# Patient Record
Sex: FEMALE | Race: WHITE | NOT HISPANIC OR LATINO | Employment: OTHER | URBAN - METROPOLITAN AREA
[De-identification: names, ages, dates, MRNs, and addresses within clinical notes are randomized per-mention and may not be internally consistent; named-entity substitution may affect disease eponyms.]

---

## 2024-03-15 ENCOUNTER — TELEPHONE (OUTPATIENT)
Dept: NEUROSURGERY | Facility: CLINIC | Age: 79
End: 2024-03-15
Payer: MEDICARE

## 2024-03-15 NOTE — TELEPHONE ENCOUNTER
----- Message from Madeline Velazquez RN sent at 3/15/2024 10:34 AM CDT -----  Contact: Dr Pulido  I scheduled this katerina lady for a virtual with vijay next week.  Lives in [place]. Hx of spinal surgery lumbar. No recent imaging. Only an mri from 2 years ago.  Lumbar stenosis, last mri 4/1/22, referrral in media mgr.  She is going to send you a letter detailing all of her hx/surgery etc. (She is a writer).   Just wanted ya'll to know.  Thanks    ----- Message -----  From: Nupur Timmons  Sent: 3/13/2024   1:58 PM CDT  To: Felix Barreto Staff    3/13/24    Referral scanned into media mgr, dx DDD- phone [phone]-     Thanks    Nupur    CC

## 2024-03-19 ENCOUNTER — OFFICE VISIT (OUTPATIENT)
Dept: NEUROSURGERY | Facility: CLINIC | Age: 79
End: 2024-03-19
Payer: MEDICARE

## 2024-03-19 DIAGNOSIS — Z98.890 HX OF DECOMPRESSIVE LUMBAR LAMINECTOMY: ICD-10-CM

## 2024-03-19 DIAGNOSIS — M48.07 SPINAL STENOSIS, LUMBOSACRAL REGION: ICD-10-CM

## 2024-03-19 DIAGNOSIS — M41.9 SCOLIOSIS, UNSPECIFIED SCOLIOSIS TYPE, UNSPECIFIED SPINAL REGION: Primary | ICD-10-CM

## 2024-03-19 PROCEDURE — 99204 OFFICE O/P NEW MOD 45 MIN: CPT | Mod: 95,,, | Performed by: NURSE PRACTITIONER

## 2024-03-20 NOTE — PROGRESS NOTES
"Neurosurgery  History & Physical    SUBJECTIVE:   Telehealth Visit     The patient location is: Home  The chief complaint leading to consultation is: Back Pain  Visit type: Virtual visit with audio and video  Total time spent with patient: 12 minutes     Each patient to whom I provide medical services by telemedicine is:  (1) informed of the relationship between the physician and patient and the respective role of any other health care provider with respect to management of the patient; and (2) notified that they may decline to receive medical services by telemedicine and may withdraw from such care at any time. Patient verbally consented to receive this service via voice-only telephone call.     This service was not originating from a related E/M service provided within the previous 7 days nor will  to an E/M service or procedure within the next 24 hours or my soonest available appointment.  Prevailing standard of care was able to be met in this audio-only visit.       History of Present Illness: Kim Adorno is a 78 y.o. female with hx of laminectomy L3-5. She is being seen virtually today to establish care and discuss concerns with persistent low back radiating into the hips. Describes the pain as deep and aching. Aggravating factors include standing and walking. Alleviating factors include rest, stretching, and pain medication. Denies b/b dysfunction, saddle anesthesia, or gait instability.  Reports neuropathy in her feet extending upward to her knees that she describes as "tight". She believes that she is weaker.     Review of patient's allergies indicates:  Not on File    No current outpatient medications on file.     No current facility-administered medications for this visit.       No past medical history on file.  No past surgical history on file.  Family History    None       Social History     Socioeconomic History    Marital status: Single       Review of Systems    OBJECTIVE:     Vital Signs   "   There is no height or weight on file to calculate BMI.      Neurosurgery Physical Exam  General: well developed, well nourished, no distress.   Head: normocephalic  Neurologic: Alert and oriented. Thought content appropriate.  GCS: Motor: 6/Verbal: 5/Eyes: 4 GCS Total: 15  Mental Status: Awake, Alert, Oriented x 4  Language: No aphasia  Speech: No dysarthria  Cranial nerves: CN II-XII grossly intact.   Eyes: EOMI appear grossly intact  Pulmonary: no signs of respiratory distress  Motor Strength:Moves all extremities spontaneously with good tone. BUE and BLE are at least 3/5. No abnormal movements seen.     Diagnostic Results:  There is no new imaging to review for this encounter.      ASSESSMENT/PLAN:     Kim Adorno is a 78 y.o. female with surgical hx of laminectomy L3-5. She is being seen virtually today to establish care and discuss concerns with persistent low back radiating into the hips with a history of scoliosis. I have ordered:    -Updated MRI of the entire spine to evaluate for stenosis given her complaints  - Dynamic x-rays for instability  - CT lumbar spine given her surgical hx of surgical planning     The patient would like to follow-up in clinic with Dr. Washington to discuss the image findings and next steps. I have encouraged her to contact the clinic with any questions, concerns, or adverse clinical changes. She verbalized understanding.      ARNALDO Glaser-BRIAN  Neurosurgery  [facility].      Note dictated with voice recognition software, please excuse any grammatical errors.

## 2024-03-22 ENCOUNTER — TELEPHONE (OUTPATIENT)
Dept: NEUROSURGERY | Facility: CLINIC | Age: 79
End: 2024-03-22
Payer: MEDICARE

## 2024-03-22 NOTE — TELEPHONE ENCOUNTER
Called patient to schedule an upcoming appointment with Estevan Washington MD  and provided next appointment date and time.  Future Appointments   Date Time Provider Department Center   5/16/2024 11:15 AM [facility] OI EOS [facility] EOS [facility]   5/16/2024 11:30 AM [facility]-CT2 500 LB LIMIT [facility] CTSC IC Imaging Ctr   5/16/2024 11:45 AM [facility] OI-XRAY [facility] XRAY [facility]   5/16/2024 12:00 PM [facility] OIC-XRAY [facility] XRAY [facility]   5/16/2024 12:15 PM [facility]-MRI3 [facility] MRI [facility]   5/16/2024  2:30 PM Estevan Washington MD [facility] NEUROS8 Kareem nhung        Patient voiced understanding and thanked me.

## 2024-05-16 ENCOUNTER — OFFICE VISIT (OUTPATIENT)
Dept: NEUROSURGERY | Facility: CLINIC | Age: 79
End: 2024-05-16
Payer: MEDICARE

## 2024-05-16 ENCOUNTER — HOSPITAL ENCOUNTER (OUTPATIENT)
Dept: RADIOLOGY | Facility: HOSPITAL | Age: 79
Discharge: HOME | End: 2024-05-16
Attending: NURSE PRACTITIONER
Payer: MEDICARE

## 2024-05-16 VITALS
DIASTOLIC BLOOD PRESSURE: 76 MMHG | HEART RATE: 85 BPM | WEIGHT: 174 LBS | TEMPERATURE: 97 F | SYSTOLIC BLOOD PRESSURE: 148 MMHG

## 2024-05-16 DIAGNOSIS — M48.07 SPINAL STENOSIS, LUMBOSACRAL REGION: ICD-10-CM

## 2024-05-16 DIAGNOSIS — Z98.890 HX OF DECOMPRESSIVE LUMBAR LAMINECTOMY: ICD-10-CM

## 2024-05-16 DIAGNOSIS — M41.9 SCOLIOSIS, UNSPECIFIED SCOLIOSIS TYPE, UNSPECIFIED SPINAL REGION: ICD-10-CM

## 2024-05-16 DIAGNOSIS — M41.9 SCOLIOSIS, UNSPECIFIED SCOLIOSIS TYPE, UNSPECIFIED SPINAL REGION: Primary | ICD-10-CM

## 2024-05-16 PROCEDURE — 99999 PR PBB SHADOW E&M-EST. PATIENT-LVL III: CPT | Mod: PBBFAC,,, | Performed by: NURSE PRACTITIONER

## 2024-05-16 PROCEDURE — 72114 X-RAY EXAM L-S SPINE BENDING: CPT | Mod: TC,59

## 2024-05-16 PROCEDURE — 72131 CT LUMBAR SPINE W/O DYE: CPT | Mod: 26,,, | Performed by: RADIOLOGY

## 2024-05-16 PROCEDURE — 72148 MRI LUMBAR SPINE W/O DYE: CPT | Mod: 26,,, | Performed by: RADIOLOGY

## 2024-05-16 PROCEDURE — 72131 CT LUMBAR SPINE W/O DYE: CPT | Mod: TC

## 2024-05-16 PROCEDURE — 72146 MRI CHEST SPINE W/O DYE: CPT | Mod: 26,,, | Performed by: RADIOLOGY

## 2024-05-16 PROCEDURE — 72082 X-RAY EXAM ENTIRE SPI 2/3 VW: CPT | Mod: TC

## 2024-05-16 PROCEDURE — 72082 X-RAY EXAM ENTIRE SPI 2/3 VW: CPT | Mod: 26,,, | Performed by: RADIOLOGY

## 2024-05-16 PROCEDURE — 72148 MRI LUMBAR SPINE W/O DYE: CPT | Mod: TC

## 2024-05-16 PROCEDURE — 99213 OFFICE O/P EST LOW 20 MIN: CPT | Mod: PBBFAC,25 | Performed by: NURSE PRACTITIONER

## 2024-05-16 PROCEDURE — 72114 X-RAY EXAM L-S SPINE BENDING: CPT | Mod: 26,,, | Performed by: INTERNAL MEDICINE

## 2024-05-16 PROCEDURE — 72141 MRI NECK SPINE W/O DYE: CPT | Mod: 26,,, | Performed by: RADIOLOGY

## 2024-05-16 PROCEDURE — 99213 OFFICE O/P EST LOW 20 MIN: CPT | Mod: S$PBB,,, | Performed by: NURSE PRACTITIONER

## 2024-05-16 RX ORDER — MAGNESIUM 200 MG
500 TABLET ORAL
COMMUNITY

## 2024-05-16 RX ORDER — BERBERINE CHLOR/SEAWEED/CHROM 500-250 MG
600 CAPSULE ORAL 2 TIMES DAILY
COMMUNITY

## 2024-05-16 RX ORDER — MELOXICAM 15 MG/1
15 TABLET ORAL DAILY
COMMUNITY

## 2024-05-16 RX ORDER — FUROSEMIDE 20 MG/1
20 TABLET ORAL DAILY
COMMUNITY
Start: 2000-10-10

## 2024-05-16 RX ORDER — ACETAMINOPHEN 500 MG
5000 TABLET ORAL DAILY
COMMUNITY

## 2024-05-16 RX ORDER — FOLIC ACID 0.8 MG
800 TABLET ORAL DAILY
COMMUNITY

## 2024-05-16 RX ORDER — VITAMIN E MIXED 400 UNIT
1 CAPSULE ORAL DAILY
COMMUNITY

## 2024-05-16 RX ORDER — ATORVASTATIN CALCIUM 10 MG/1
10 TABLET, FILM COATED ORAL DAILY
COMMUNITY

## 2024-05-16 RX ORDER — GLUCOSAMINE/CHONDRO SU A 500-400 MG
500 TABLET ORAL 2 TIMES DAILY
COMMUNITY

## 2024-05-16 RX ORDER — UBIDECARENONE/VIT E ACET 100MG-5
350 CAPSULE ORAL 2 TIMES DAILY
COMMUNITY

## 2024-05-16 RX ORDER — ACETAMINOPHEN, DIPHENHYDRAMINE HCL, PHENYLEPHRINE HCL 325; 25; 5 MG/1; MG/1; MG/1
1000 TABLET ORAL DAILY
COMMUNITY

## 2024-05-22 NOTE — PROGRESS NOTES
"Neurosurgery  Established Patient    SUBJECTIVE:     History of Present Illness:  Kim Adonro is a 78 y.o. female with hx of laminectomy L3-5. She is being seen virtually today to establish care and discuss concerns with persistent low back radiating into the hips. Describes the pain as deep and aching. Aggravating factors include standing and walking. Alleviating factors include rest, stretching, and pain medication. Denies b/b dysfunction, saddle anesthesia, or gait instability.  Reports neuropathy in her feet extending upward to her knees that she describes as "tight". She believes that she is weaker.     Interval Hx 5/16/24: After the last appointment it was recommended that the patient obtain updated imaging and follow-up with Dr. Washington to discuss next steps. She is being seen in clinic today with myself and Dr. Washington to discuss the image findings. States that she is not have as much pain today. When she does experience pain it is typically in the left hip with standing and prolonged sitting. Denies new neurological deficits.     Review of patient's allergies indicates:  Not on File    Current Outpatient Medications   Medication Sig Dispense Refill    atorvastatin (LIPITOR) 10 MG tablet Take 10 mg by mouth once daily.      cholecalciferol, vitamin D3, 125 mcg (5,000 unit) Tab Take 5,000 Units by mouth once daily.      cyanocobalamin, vitamin B-12, 5,000 mcg Subl Place 1,000 % under the tongue once daily.      denosumab (PROLIA) 60 mg/mL Syrg Inject 60 mg into the skin every 6 (six) months.      furosemide (LASIX) 20 MG tablet Take 20 mg by mouth once daily.      magnesium 200 mg Tab Take 500 mg by mouth.      meloxicam (MOBIC) 15 MG tablet Take 15 mg by mouth once daily.      vitamin E, dl,tocopheryl acet, (VITAMIN E, DL, ACETATE,) 180 mg (400 unit) Cap Take 1 capsule by mouth once daily.      AMERICAN GINSENG ROOT, BULK, MISC 550 mg by Misc.(Non-Drug; Combo Route) route once daily.      ashwagandha " extract 120 mg Cap Take 600 mg by mouth 2 (two) times a day.      ASTRAGALUS ROOT ORAL Take 500 mg by mouth 2 (two) times a day.      folic acid (FOLVITE) 800 MCG Tab Take 800 mcg by mouth once daily.      glucosamine-chondroitin 500-400 mg tablet Take 500 tablets by mouth 2 (two) times a day.      magnesium aspartate HCl 61 mg (615 mg) TbEC Take 400 mg by mouth once.      resveratroL 50 mg Cap Take 350 mg by mouth 2 (two) times a day.      TURMERIC ORAL Take 1,000 mg by mouth once daily.      VINPOCETINE, BULK, MISC 20 mg by Misc.(Non-Drug; Combo Route) route once daily.       No current facility-administered medications for this visit.       No past medical history on file.  No past surgical history on file.  Family History    None       Social History     Socioeconomic History    Marital status: Single   Tobacco Use    Smoking status: Never    Smokeless tobacco: Never     Social Determinants of Health     Financial Resource Strain: Low Risk  (9/10/2020)    Received from [facility]    Overall Financial Resource Strain (CARDIA)     Difficulty of Paying Living Expenses: Not hard at all   Food Insecurity: Unknown (9/10/2020)    Received from [facility]    Hunger Vital Sign     Worried About Running Out of Food in the Last Year: Patient declined     Ran Out of Food in the Last Year: Patient declined   Transportation Needs: Unknown (9/10/2020)    Received from [facility]    PRAPARE - Transportation     Lack of Transportation (Medical): Patient declined     Lack of Transportation (Non-Medical): Patient declined       Review of Systems    OBJECTIVE:     Vital Signs  Temp: 97.4 °F (36.3 °C)  Pulse: 85  BP: (!) 148/76  Pain Score: 0-No pain  Weight: 78.9 kg (174 lb)  There is no height or weight on file to calculate BMI.    Neurosurgery Physical Exam  General: well developed, well nourished, no distress.   Head: normocephalic, atraumatic  Neurologic:  Alert and oriented. Thought content appropriate.  GCS: Motor: 6/Verbal: 5/Eyes: 4 GCS Total: 15  Mental Status: Awake, Alert, Oriented x 4  Language: No aphasia  Speech: No dysarthria  Cranial nerves: face symmetric, tongue midline, CN II-XII grossly intact.   Eyes: pupils equal, round, reactive to light with accomodation, EOMI.   Pulmonary: normal respirations, no signs of respiratory distress  Abdomen: soft, non-distended  Skin: Skin is warm, dry and intact.  Sensory: intact to light touch throughout  Motor Strength:Moves all extremities spontaneously with good tone.      Diagnostic Results:  I have personally reviewed the imaging dated 5/16/24 with Dr. Washington:    MRI cervical thoracic lumbar spine which shows moderate multilevel degenerative changes of the cervical and lumbar spine C2-C7, L1-L3 and L5-S1.     2.  Scoliosis series shows dextroconvex curvature thoracolumbar junction.     3.  X-ray cervical spine flex/ex shows no instability    4.  X-ray lumbar spine flex/ex shows no instability     5.  CT lumbar spine shows postsurgical changes of L3-L5 posterior decompression.     ASSESSMENT/PLAN:     Kim Adorno is a 79 y.o. female with hx of laminectomy L3-5 seen in clinic today with myself and Dr. Washington for her low back pain. After reviewing the imaging and concerns, Dr. Washington does not recommend additional surgery at this time but to continue with conservative treatment options. She was agreeable. I would like the patient to follow-up in clinic as needed. I have encouraged her to contact the clinic with any questions, concerns, or adverse clinical changes. She verbalized understanding.      CAMILLA Glaser  Neurosurgery  [facility].      Note dictated with voice recognition software, please excuse any grammatical errors.

## 2025-08-06 ENCOUNTER — PATIENT MESSAGE (OUTPATIENT)
Dept: NEUROSURGERY | Facility: CLINIC | Age: 80
End: 2025-08-06
Payer: MEDICARE

## 2025-08-25 ENCOUNTER — PATIENT MESSAGE (OUTPATIENT)
Dept: NEUROSURGERY | Facility: CLINIC | Age: 80
End: 2025-08-25
Payer: MEDICARE

## 2025-08-26 ENCOUNTER — OFFICE VISIT (OUTPATIENT)
Dept: NEUROSURGERY | Facility: CLINIC | Age: 80
End: 2025-08-26
Payer: MEDICARE

## 2025-08-26 DIAGNOSIS — M54.9 DORSALGIA, UNSPECIFIED: ICD-10-CM

## 2025-08-26 DIAGNOSIS — M41.9 SCOLIOSIS, UNSPECIFIED SCOLIOSIS TYPE, UNSPECIFIED SPINAL REGION: ICD-10-CM

## 2025-08-26 DIAGNOSIS — M48.07 SPINAL STENOSIS, LUMBOSACRAL REGION: ICD-10-CM

## 2025-08-26 DIAGNOSIS — M25.559 HIP PAIN, UNSPECIFIED LATERALITY: Primary | ICD-10-CM

## 2025-08-26 DIAGNOSIS — M25.552 LEFT HIP PAIN: ICD-10-CM

## 2025-08-26 PROCEDURE — 98005 SYNCH AUDIO-VIDEO EST LOW 20: CPT | Mod: 95,,, | Performed by: NURSE PRACTITIONER
